# Patient Record
(demographics unavailable — no encounter records)

---

## 2024-12-02 NOTE — PHYSICAL EXAM
[Healthy Appearing] : healthy appearing [Well Nourished] : well nourished [Interactive] : interactive [Well formed] : well formed [Normally Set] : normally set [Enlarged Diffusely] : was diffusely enlarged [Rubbery] : had a rubbery consistency [Normal S1 and S2] : normal S1 and S2 [Clear to Ausculation Bilaterally] : clear to auscultation bilaterally [Abdomen Soft] : soft [Abdomen Tenderness] : non-tender [] : no hepatosplenomegaly [3] : was Daryl stage 3 [Normal Appearance] : normal in appearance [Daryl Stage ___] : the Daryl stage for breast development was [unfilled] [Normal] : normal  [Murmur] : no murmurs [de-identified] : deferred today

## 2024-12-02 NOTE — CONSULT LETTER
[Dear  ___] : Dear  [unfilled], [Consult Letter:] : I had the pleasure of evaluating your patient, [unfilled]. [Please see my note below.] : Please see my note below. [Consult Closing:] : Thank you very much for allowing me to participate in the care of this patient.  If you have any questions, please do not hesitate to contact me. [Sincerely,] : Sincerely, [FreeTextEntry3] : Tonya Keen MD [facility] Physician [facility] Division of Pediatric Endocrinology  [Normal Posterior Cervical Nodes] : no posterior cervical lymphadenopathy [Normal Anterior Cervical Nodes] : no anterior cervical lymphadenopathy [Normal] : no joint swelling and grossly normal strength and tone [Coordination Grossly Intact] : coordination grossly intact [No Focal Deficits] : no focal deficits [Normal Gait] : normal gait [Speech Grossly Normal] : speech grossly normal [Memory Grossly Normal] : memory grossly normal [Normal Affect] : the affect was normal [Alert and Oriented x3] : oriented to person, place, and time [Normal Mood] : the mood was normal [Normal Insight/Judgement] : insight and judgment were intact [FreeTextEntry1] : note perianal excoriations [de-identified] : as above

## 2024-12-02 NOTE — PHYSICAL EXAM
[Healthy Appearing] : healthy appearing [Well Nourished] : well nourished [Interactive] : interactive [Well formed] : well formed [Normally Set] : normally set [Enlarged Diffusely] : was diffusely enlarged [Rubbery] : had a rubbery consistency [Normal S1 and S2] : normal S1 and S2 [Clear to Ausculation Bilaterally] : clear to auscultation bilaterally [Abdomen Soft] : soft [Abdomen Tenderness] : non-tender [] : no hepatosplenomegaly [3] : was Daryl stage 3 [Normal Appearance] : normal in appearance [Daryl Stage ___] : the Daryl stage for breast development was [unfilled] [Normal] : normal  [Murmur] : no murmurs [de-identified] : deferred today

## 2024-12-02 NOTE — HISTORY OF PRESENT ILLNESS
[FreeTextEntry2] :  Alicia is a 15y0mo old girl with Mcadams Syndrome (one fragile X allele, isodicentric Xq with SHOX deletion), acquired hypothyroidism age 1, gonadal failure, global developmental delay, Crohn's Disease, myopia of L eye, latent TB infection, atopic dermatitis presenting for followup. She transferred care from [place] to [facility] in October 2023. Monitoring labs significant for LH 16 miu/ml,  miu/ml, estradiol <1 pg/ml (consistent with ovarian failure), low AMH, TSH 23 uiu/ml (high), FT4 1.3 ng/dl, T4 9.3 ug/dl (normal), negative celiac screen, A1c 5.3%, Fragile X probe negative for fragile X expansion by PCR. Estrogen patch was re-initiated. She was last seen on 7/25/24 2024. Labs significant for TSH 7.17 uiu.ml (high), FT4 1.7 ng/dl (normal),T4 11.3 ug/dl (normal), estradiol 36 pg/ml, vitamin D 21.8 ng/ml, lipid panel with elevated  mg/dl, negative celiac screen. Levothyroxine was increased from 125 mcg to 137 daily and estrogen patch was increased from 0.025mg/24hr to 0.0375mg/24hr.  Since her last visit, ALICIA has been well with no recent illness or hospitalization. Alicia continues to take synthroid brand 137 mcg daily. She takes it in the in the morning with typically great compliance. She has no fatigue, constipation, diarrhea, temperature intolerance, unexpected weight changes, skin changes, or hair changes. No thyroid compressive symptoms.  Alicia was previously on vivelle dot 0.025mg/24hr prior to transferring care for about 1 year. She had menarche while using the patch however the patch was discontinued due to blood in her stools. Patch was restarted after establishing care in October 2023, and increased to 0.0375mg/24hr in July 2024. She wears the patch on her abdomen and back and changes it on Wednesdays and Saturdays. She continues to notice increased breast size. Last month she had a period for 8 days. Prior to that would have spotting for 1-2 days  Alicia follows with GI for management of Crohn's disease. She opted for management with a strict diet over medication. She is now at phase 3 of the CDED diet (Crohn's disease elimination diet). She previously lost weight as suggested. Her inflammatory markers (calprotectin) are decreasing. She also saw cardiology for TS surveillance however cardiologist documented the visit was for dizziness and recommended she follow up as needed. Echo significant for aberrant right subclavian artery which is a normal variant.

## 2024-12-02 NOTE — HISTORY OF PRESENT ILLNESS
[FreeTextEntry2] :  Alicia is a 15y0mo old girl with Mcadams Syndrome (one fragile X allele, isodicentric Xq with SHOX deletion), acquired hypothyroidism age 1, gonadal failure, global developmental delay, Crohn's Disease, myopia of L eye, latent TB infection, atopic dermatitis presenting for followup. She transferred care from [place] to [facility] in October 2023. Monitoring labs significant for LH 16 miu/ml,  miu/ml, estradiol <1 pg/ml (consistent with ovarian failure), low AMH, TSH 23 uiu/ml (high), FT4 1.3 ng/dl, T4 9.3 ug/dl (normal), negative celiac screen, A1c 5.3%, Fragile X probe negative for fragile X expansion by PCR. Estrogen patch was re-initiated. She was last seen on 7/25/24 2024. Labs significant for TSH 7.17 uiu.ml (high), FT4 1.7 ng/dl (normal),T4 11.3 ug/dl (normal), estradiol 36 pg/ml, vitamin D 21.8 ng/ml, lipid panel with elevated  mg/dl, negative celiac screen. Levothyroxine was increased from 125 mcg to 137 daily and estrogen patch was increased from 0.025mg/24hr to 0.0375mg/24hr.  Since her last visit, ALICIA has been well with no recent illness or hospitalization. Alicia continues to take synthroid brand 137 mcg daily. She takes it in the in the morning with typically great compliance. She has no fatigue, constipation, diarrhea, temperature intolerance, unexpected weight changes, skin changes, or hair changes. No thyroid compressive symptoms.  Alicia was previously on vivelle dot 0.025mg/24hr prior to transferring care for about 1 year. She had menarche while using the patch however the patch was discontinued due to blood in her stools. Patch was restarted after establishing care in October 2023, and increased to 0.0375mg/24hr in July 2024. She wears the patch on her abdomen and back and changes it on Wednesdays and Saturdays. She continues to notice increased breast size. Last month she had a period for 8 days. Prior to that would have spotting for 1-2 days  Alicia follows with GI for management of Crohn's disease. She opted for management with a strict diet over medication. She is now at phase 3 of the CDED diet (Crohn's disease elimination diet). She previously lost weight as suggested. Her inflammatory markers (calprotectin) are decreasing. She also saw cardiology for TS surveillance however cardiologist documented the visit was for dizziness and recommended she follow up as needed. Echo significant for aberrant right subclavian artery which is a normal variant.

## 2024-12-02 NOTE — CONSULT LETTER
[Dear  ___] : Dear  [unfilled], [Consult Letter:] : I had the pleasure of evaluating your patient, [unfilled]. [Please see my note below.] : Please see my note below. [Consult Closing:] : Thank you very much for allowing me to participate in the care of this patient.  If you have any questions, please do not hesitate to contact me. [Sincerely,] : Sincerely, [FreeTextEntry3] : Tonya Keen MD [facility] Physician [facility] Division of Pediatric Endocrinology

## 2025-03-28 NOTE — HISTORY OF PRESENT ILLNESS
[FreeTextEntry2] : She will have bleeding for 2-3 days. Had a period in February and January empty stomach crohns disease is going better, feels better. on an eliminatiin diet and prescribed [employer] to .05 tfts today [employer] grade [employer] walks. can keep up but sometimes feels tire normal tfts now continue same dose, lfts improving but still high d/w gi at next appt review w mom.

## 2025-03-28 NOTE — HISTORY OF PRESENT ILLNESS
[FreeTextEntry2] : She will have bleeding for 2-3 days. Had a period in February and January empty stomach crohns disease is going better, feels better. 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 can keep up but sometimes feels tire normal tfts now continue same dose, lfts improving but still high d/w gi at next appt review w mom.

## 2025-05-20 NOTE — CONSULT LETTER
[Dear  ___] : Dear  [unfilled], [Courtesy Letter:] : I had the pleasure of seeing your patient, [unfilled], in my office today. [Please see my note below.] : Please see my note below. [Consult Closing:] : Thank you very much for allowing me to participate in the care of this patient.  If you have any questions, please do not hesitate to contact me. [Sincerely,] : Sincerely, [FreeTextEntry3] : SAMY Jaramillo

## 2025-05-20 NOTE — ASSESSMENT
[Educated Patient & Family about Diagnosis] : educated the patient and family about the diagnosis [FreeTextEntry1] : Beatriz is a 15 year old female with Mcadams syndrome and hypothyroidism who initially presented with blood MA with an elevated calprotectin therefore had an IBD evaluation consistent with mild Crohn's involving the ileum and right colon (histologically). She has had resolution of symptoms  on the Crohn's Disease Elimination Diet and has remained on Phase 3 for the past year. Her GI symptoms have completely resolved and with recent improvement in her Vit D and thyroid function studies. Should remain on the diet for now. Will proceed with assessment for mucosal healing which will include blood work, fecal calprotectin and colonoscopy.

## 2025-05-20 NOTE — HISTORY OF PRESENT ILLNESS
[de-identified] : eggs + rice cracker + 1 amnan [facility] Ped Standard 1.2 [de-identified] : salad + rice + chicken [de-identified] : apple, banana [de-identified] : potato + salad + chicken; sometimes vegetable soup [FreeTextEntry1] : Nutritionist Intake: 15 year old female with Mcadams syndrome and hypothyroidism, developmental delay, recently diagnosed with Crohn's disease, here for nutrition follow up.  Last seen in June 2024. Pt started on Crohn's Disease Exclusion Diet (CDED) in April 2024.  She is currently on Phase 3 (Maintenance phase), started last summer.  Mom reports good compliance with the diet.  Pt has a good appetite, eating 3 meals/day. She is consuming allowed foods such as chicken, eggs, apple, banana, potato, rice, cucumber, lettuce, carrot, plantain, yogurt.  Mom cooks fresh meals at home. She makes homemade french fries. Pt and mother read food labels and look at ingredients. For "free meals" pt may order pasta or Caesar salad at a restaurant. Pt is drinking [employer] Ped Standard 1.2 (vanilla and chocolate), 2-3 cartons/day (600-900 kcal/d). Pt lost 1.3 kg since last visit ~11 months ago. Wt loss desirable as BMI was at the 93rd percentile; now down to the 91st percentile.  Taking vitamin D supplement. No GI symptoms reported.

## 2025-05-20 NOTE — HISTORY OF PRESENT ILLNESS
[de-identified] : 14 year old with Mcadams syndrome whom presented in October 2023 with rectal bleeding. 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 After her scope a MRE was recommended but there was issues with authorization delaying testing. Prior to her MRE in Feb she was seen in our ED for acute severe abdominal pain, bloat sensation, decrease appetite, and 1-2 loose, non bloody, stools per day. She was seen in our office and prescribed entocort while awaiting the results of her MRE. MRE on March 18, 2024 showed mild restricted diffusion hyperenhancement of a short segment of terminal ileum. She used Entocort without benefit therefore discontinued use. She was started on CDED as her sole treatment in April 2024 with good improvement in her symptoms and good compliance of diet.   Interval HX: No communication since August 2024. Calpro at that time decreased from 1000 to 554 on Phase 2 of CDED diet. She started on Phase 3 last summer and has remained complaint with diet since. Dietician saw patient today and confirmed compliance as well. She is asymptomatic with resolution of fatigue, abdominal pain, and diarrhea. Also, her most recent follow up with endocrinology confirmed normalization of her thyroid function. Has required chronic Vit D supplementation but discontinued in November due to improvements on lab work.